# Patient Record
Sex: FEMALE | Race: WHITE
[De-identification: names, ages, dates, MRNs, and addresses within clinical notes are randomized per-mention and may not be internally consistent; named-entity substitution may affect disease eponyms.]

---

## 2022-09-09 ENCOUNTER — HOSPITAL ENCOUNTER (EMERGENCY)
Dept: HOSPITAL 47 - EC | Age: 32
Discharge: HOME | End: 2022-09-09
Payer: COMMERCIAL

## 2022-09-09 VITALS
SYSTOLIC BLOOD PRESSURE: 127 MMHG | HEART RATE: 79 BPM | TEMPERATURE: 98 F | RESPIRATION RATE: 20 BRPM | DIASTOLIC BLOOD PRESSURE: 77 MMHG

## 2022-09-09 DIAGNOSIS — Z88.1: ICD-10-CM

## 2022-09-09 DIAGNOSIS — J18.1: Primary | ICD-10-CM

## 2022-09-09 LAB
ALBUMIN SERPL-MCNC: 3.8 G/DL (ref 3.5–5)
ALP SERPL-CCNC: 98 U/L (ref 38–126)
ALT SERPL-CCNC: 16 U/L (ref 4–34)
AMYLASE SERPL-CCNC: 74 U/L (ref 30–110)
ANION GAP SERPL CALC-SCNC: 10 MMOL/L
AST SERPL-CCNC: 22 U/L (ref 14–36)
BASOPHILS # BLD AUTO: 0 K/UL (ref 0–0.2)
BASOPHILS NFR BLD AUTO: 0 %
BUN SERPL-SCNC: 23 MG/DL (ref 7–17)
CALCIUM SPEC-MCNC: 8.8 MG/DL (ref 8.4–10.2)
CHLORIDE SERPL-SCNC: 100 MMOL/L (ref 98–107)
CO2 SERPL-SCNC: 28 MMOL/L (ref 22–30)
EOSINOPHIL # BLD AUTO: 0.1 K/UL (ref 0–0.7)
EOSINOPHIL NFR BLD AUTO: 1 %
ERYTHROCYTE [DISTWIDTH] IN BLOOD BY AUTOMATED COUNT: 4.13 M/UL (ref 3.8–5.4)
ERYTHROCYTE [DISTWIDTH] IN BLOOD: 12.7 % (ref 11.5–15.5)
GLUCOSE SERPL-MCNC: 66 MG/DL (ref 74–99)
HCT VFR BLD AUTO: 41.1 % (ref 34–46)
HGB BLD-MCNC: 14 GM/DL (ref 11.4–16)
LIPASE SERPL-CCNC: 237 U/L (ref 23–300)
LYMPHOCYTES # SPEC AUTO: 1.4 K/UL (ref 1–4.8)
LYMPHOCYTES NFR SPEC AUTO: 12 %
MCH RBC QN AUTO: 34 PG (ref 25–35)
MCHC RBC AUTO-ENTMCNC: 34.1 G/DL (ref 31–37)
MCV RBC AUTO: 99.5 FL (ref 80–100)
MONOCYTES # BLD AUTO: 0.6 K/UL (ref 0–1)
MONOCYTES NFR BLD AUTO: 6 %
NEUTROPHILS # BLD AUTO: 9 K/UL (ref 1.3–7.7)
NEUTROPHILS NFR BLD AUTO: 80 %
PLATELET # BLD AUTO: 270 K/UL (ref 150–450)
POTASSIUM SERPL-SCNC: 3.8 MMOL/L (ref 3.5–5.1)
PROT SERPL-MCNC: 6.3 G/DL (ref 6.3–8.2)
SODIUM SERPL-SCNC: 138 MMOL/L (ref 137–145)
WBC # BLD AUTO: 11.3 K/UL (ref 3.8–10.6)

## 2022-09-09 PROCEDURE — 71046 X-RAY EXAM CHEST 2 VIEWS: CPT

## 2022-09-09 PROCEDURE — 96361 HYDRATE IV INFUSION ADD-ON: CPT

## 2022-09-09 PROCEDURE — 83690 ASSAY OF LIPASE: CPT

## 2022-09-09 PROCEDURE — 74177 CT ABD & PELVIS W/CONTRAST: CPT

## 2022-09-09 PROCEDURE — 80053 COMPREHEN METABOLIC PANEL: CPT

## 2022-09-09 PROCEDURE — 84484 ASSAY OF TROPONIN QUANT: CPT

## 2022-09-09 PROCEDURE — 71275 CT ANGIOGRAPHY CHEST: CPT

## 2022-09-09 PROCEDURE — 96374 THER/PROPH/DIAG INJ IV PUSH: CPT

## 2022-09-09 PROCEDURE — 80175 DRUG SCREEN QUAN LAMOTRIGINE: CPT

## 2022-09-09 PROCEDURE — 99285 EMERGENCY DEPT VISIT HI MDM: CPT

## 2022-09-09 PROCEDURE — 36415 COLL VENOUS BLD VENIPUNCTURE: CPT

## 2022-09-09 PROCEDURE — 82150 ASSAY OF AMYLASE: CPT

## 2022-09-09 PROCEDURE — 85025 COMPLETE CBC W/AUTO DIFF WBC: CPT

## 2022-09-09 PROCEDURE — 96375 TX/PRO/DX INJ NEW DRUG ADDON: CPT

## 2022-09-09 NOTE — CT
EXAMINATION TYPE: CT abdomen pelvis w con

 

DATE OF EXAM: 9/9/2022

 

COMPARISON: None

 

HISTORY: RUQ pain

 

CT DLP: 803.4 mGycm

Automated exposure control for dose reduction was used.

 

CONTRAST: 

Performed with IV Contrast, patient injected with 100 mL of Isovue 370.

 

Images obtained from the diaphragm to the floor the pelvis with the IV contrast.

 

There is some minimal infiltrate or atelectasis at the posterior lung bases. Heart size is normal. No
 pericardial effusion. Liver spleen and stomach pancreas appear intact. Gallbladder is contracted. Th
e bile ducts are nondilated.

 

There is no adrenal mass. Kidneys show satisfactory contrast opacification. There is no hydronephrosi
s. Ureters are not dilated. No retroperitoneal adenopathy. Bladder distends smoothly. Uterus is antev
erted. No evidence of a pelvic mass. No free fluid in the pelvis. Exam limited by lack of any intesti
nal contrast. Appendix is not seen.

 

The lumbar vertebrae have normal alignment. Disc spaces are fairly normal. No compression fracture. T
he posterior elements are intact. Bony pelvis intact. Hip joints are intact. There is no mesenteric e
[place]. No ascites or free air. No sign of a bowel obstruction.

 

IMPRESSION:

Negative CT scan of the abdomen pelvis. Minimal subpleural infiltrates at the lung bases. Appendix no
t well evaluated.

## 2022-09-09 NOTE — XR
EXAMINATION TYPE: XR chest 2V

 

DATE OF EXAM: 9/9/2022

 

COMPARISON: NONE

 

HISTORY: Rib pain

 

TECHNIQUE: 2 views

 

FINDINGS: Heart is normal. Lungs are clear of consolidation. There are no hilar masses. Costophrenic 
angles are clear. The bony thorax is intact. The pulmonary vascularity is normal.

 

IMPRESSION: No active cardiopulmonary disease. Normal heart.

## 2022-09-09 NOTE — CT
EXAMINATION TYPE: CT angio chest

 

DATE OF EXAM: 9/9/2022

 

COMPARISON: None

 

HISTORY: RUQ pain

Chest pain

CT DLP: mGycm

Automated exposure control for dose reduction was used.

 

CONTRAST: 

Performed with IV Contrast, patient injected with 100 mL of Isovue 370.

There are Three-D postprocessed images.

The lungs are clear of consolidation. There is slight increased interstitial density in the lungs. Th
ere are subpleural minimal infiltrate in the posterior right lower lobe measuring 1 cm heart size is 
normal. No pericardial effusion. There are no hilar masses. There is no mediastinal adenopathy there 
is normal contrast opacification of the pulmonary arteries. No filling defect.

 

IMPRESSION:

No evidence of pulmonary embolism. Small subpleural infiltrates right lower lobe are likely inflammat
ory. Normal heart.